# Patient Record
Sex: MALE | Race: BLACK OR AFRICAN AMERICAN | URBAN - METROPOLITAN AREA
[De-identification: names, ages, dates, MRNs, and addresses within clinical notes are randomized per-mention and may not be internally consistent; named-entity substitution may affect disease eponyms.]

---

## 2017-01-20 ENCOUNTER — HISTORICAL (OUTPATIENT)
Dept: ADMINISTRATIVE | Facility: HOSPITAL | Age: 72
End: 2017-01-20

## 2017-08-11 ENCOUNTER — HISTORICAL (OUTPATIENT)
Dept: ADMINISTRATIVE | Facility: HOSPITAL | Age: 72
End: 2017-08-11

## 2018-10-29 ENCOUNTER — HISTORICAL (OUTPATIENT)
Dept: ADMINISTRATIVE | Facility: HOSPITAL | Age: 73
End: 2018-10-29

## 2019-01-06 ENCOUNTER — HISTORICAL (OUTPATIENT)
Dept: ADMINISTRATIVE | Facility: HOSPITAL | Age: 74
End: 2019-01-06

## 2019-02-05 ENCOUNTER — HISTORICAL (OUTPATIENT)
Dept: ADMINISTRATIVE | Facility: HOSPITAL | Age: 74
End: 2019-02-05

## 2019-02-14 ENCOUNTER — HISTORICAL (OUTPATIENT)
Dept: ADMINISTRATIVE | Facility: HOSPITAL | Age: 74
End: 2019-02-14

## 2019-03-16 ENCOUNTER — HISTORICAL (OUTPATIENT)
Dept: ADMINISTRATIVE | Facility: HOSPITAL | Age: 74
End: 2019-03-16

## 2019-04-04 ENCOUNTER — HISTORICAL (OUTPATIENT)
Dept: ADMINISTRATIVE | Facility: HOSPITAL | Age: 74
End: 2019-04-04

## 2022-09-13 NOTE — ED PROVIDER NOTES
Patient:   Murtaza Mantilla             MRN: [id number]            FIN: [id number]               Age:   73 years     Sex:  Male     :  1945   Associated Diagnoses:   Rib pain on left side; Accidental fall   Author:   Ruy Engle MD      Basic Information   Time seen: Date 2019, Immediately upon arrival.   History source: Patient.   Arrival mode: Private vehicle.   History limitation: None.   Additional information: Chief Complaint from Nursing Triage Note : Chief Complaint   2019 12:44 CST       Chief Complaint           reports falling at residence on wed, currently c/o L rib pain, denies SOB, ambulatory, denies hitting head, no LOC  .      History of Present Illness   The patient presents following fall.  The onset was 4  days ago.  The occurrence was single episode.  The fall was described as tripped, slipped, fell from ground level.  The location where the incident occurred was at home.  Location: Left lateral chest. The character of symptoms is pain.  The degree at present is none.  The exacerbating factor is none.  The relieving factor is rest.  Risk factors consist of age.  The patient's dominant hand is the right hand.  Therapy today: none.  Preceding symptoms none.  Associated symptoms: denies chest pain, denies dizziness, denies syncope, denies headache, denies nausea, denies vomiting, denies abdominal pain, denies shortness of breath, denies loss of consciousness, denies fever and denies chills.       72y/o BM presents with complaint of fall, onset 4 days ago. He reports that he accidentally slipped and fell onto the ground hurting his left ribs. Pt was fine however today it was hurting more so he decided to come and get checked. Pt drove himself.  AAox4, GCS 15.        Review of Systems   Constitutional symptoms:  No fever, no chills, no sweats, no weakness, no fatigue, no decreased activity.    Skin symptoms:  No rash, no breakdown.    Eye symptoms:  No recent vision problems, no  diplopia, no blurred vision.    Respiratory symptoms:  No shortness of breath, no cough.    Cardiovascular symptoms:  No chest pain, no palpitations, no tachycardia, no syncope, no peripheral edema.    Gastrointestinal symptoms:  No abdominal pain, no nausea, no vomiting.    Musculoskeletal symptoms:  Muscle pain, no back pain, no Joint pain.    Neurologic symptoms:  No headache, no dizziness, no altered level of consciousness, no numbness, no tingling, no weakness.              Additional review of systems information: All systems reviewed as documented in chart.      Health Status   Allergies:    Allergic Reactions (Selected)  No Known Allergies,    Allergies (1) Active Reaction  No Known Allergies None Documented.   Medications:  (Selected)   Prescriptions  Prescribed  naproxen 500 mg oral tablet: 500 mg = 1 tab(s), Oral, BID, PRN PRN for pain, with food, # 20 tab(s), 0 Refill(s), [id number]  traMADol 50 mg oral tablet: 50 mg = 1 tab(s), Oral, q4hr, PRN PRN for pain, # 24 tab(s), 0 Refill(s), [facility].      Past Medical/ Family/ Social History   Medical history:    Active  HTN - Hypertension ([id number])  GERD - Gastro-esophageal reflux disease ([id number]), Reviewed as documented in chart.   Surgical history:    FACIAL SURGERY., Reviewed as documented in chart.   Family history:    Entire family history is negative., Reviewed as documented in chart.   Social history:    Social & Psychosocial Habits    Alcohol  08/11/2017  Use: Past    02/28/2018  Use: Current    Frequency: 1-2 times per month    Has alcohol use interfered with work or home life? No    Do you ever drink more than intended? No    Has anyone been hurt or at risk by your drinking? No    Ready to change: No    Concerns about alcohol use in household: No    Substance Abuse  12/21/2016  Use: Never    Tobacco  02/16/2015  Use: Current some day smoker    Type: Cigarettes    Previous treatment: None    Ready to  change: No    Concerns about tobacco use in household: No    08/11/2017  Use: Current some day smoker    09/20/2018  Use: Current some day smoker    11/19/2018  Use: Current some day smoker    Patient Wants Consult For Cessation Counseling No    11/26/2018  Use: Current every day smoker    Patient Wants Consult For Cessation Counseling No    12/03/2018  Use: Current every day smoker    Patient Wants Consult For Cessation Counseling No    12/10/2018  Use: Current every day smoker    Patient Wants Consult For Cessation Counseling No    12/28/2018  Use: Current every day smoker    Type: Cigarettes    Patient Wants Consult For Cessation Counseling N/A    01/06/2019  Use: 5-9 cigarettes (between 1    Patient Wants Consult For Cessation Counseling No, Reviewed as documented in chart.   Problem list:    Active Problems (6)  GERD (gastroesophageal reflux disease)   GERD - Gastro-esophageal reflux disease   HTN (hypertension)   HTN - Hypertension   Knowledge deficit   Tobacco user .      Physical Examination               Vital Signs   Vital Signs   1/6/2019 12:44 CST       Temperature Oral          36.6 DegC                             Temperature Oral (calculated)             97.88 DegF                             Peripheral Pulse Rate     74 bpm                             Respiratory Rate          16 br/min                             SpO2                      100 %                             Oxygen Therapy            Room air                             Systolic Blood Pressure   153 mmHg  HI                             Diastolic Blood Pressure  73 mmHg  .      Vital Signs (last 24 hrs)_____  Last Charted___________  Temp Oral     36.6 DegC  (JAN 06 12:44)  Heart Rate Peripheral   74 bpm  (JAN 06 12:44)  Resp Rate         16 br/min  (JAN 06 12:44)  SBP      H 153mmHg  (JAN 06 12:44)  DBP      73 mmHg  (JAN 06 12:44)  SpO2      100 %  (JAN 06 12:44)  Weight      61.35 kg  (JAN 06 12:44)  Height      162 cm  (JAN 06  12:44)  BMI      23.38  (JAN 06 12:44).   General:  Alert, no acute distress.    Neida coma scale:  Eye response: 4 /4, verbal response: 5 /5, motor response: 6 /6, Total score: Total score: 15.    Neurological:  Alert and oriented to person, place, time, and situation, No focal neurological deficit observed, normal sensory observed, normal motor observed, normal speech observed, normal coordination observed.    Skin:  Intact, normal for ethnicity.    Head:  Normocephalic.   Neck:  Supple.   Eye:  Normal conjunctiva.   Ears, nose, mouth and throat:  Oral mucosa moist.   Cardiovascular:  Regular rate and rhythm, Normal peripheral perfusion.    Respiratory:  Lungs are clear to auscultation, respirations are non-labored, breath sounds are equal.    Chest wall:  On exam: Left, lateral, mild, tenderness, reproduces complaint, no ecchymosis, no rash, no shingles, no crepitus, no subcutaneous emphysema, no deformity, no palpable rib fracture(s), no paradoxical motion.   Back:  Nontender, Normal range of motion, Normal alignment, no step-offs.    Gastrointestinal:  Soft, Nontender, Non distended, Normal bowel sounds.    Psychiatric:  Cooperative.      Medical Decision Making   Rationale:  Pt is awake, alert, oriented to self, time, year, place. Remebers how he fell, denies head trauma, LOC. Not on any blood thinners.  Phyiscal exam reveals no trauma to above clavicels. Pt denies headache. Pt drove himself to the ER for evaluation.  .   Documents reviewed:  Emergency department nurses' notes, emergency department records, prior records.    Radiology results:  Rad Results (ST)  < 12 hrs   Accession: [id number]  Order: XR Ribs Left 2 Views  Report Dt/Tm: 01/06/2019 14:32  Report:      CLINICAL: Fall.     FINDINGS: Diffuse demineralization of the bones. No acute fracture  deformity of the left rib cage identified.      IMPRESSION:     No left rib cage fracture identified.       Accession: [id number]  Order: XR Chest 2  Views  Report Dt/Tm: 01/06/2019 14:31  Report:      CLINICAL:  Fall, left rib pain.     COMPARISON: August 11, 2017.     FINDINGS:  Cardiopericardial silhouette is within normal limits.   Lungs are without dense focal or segmental consolidation, congestion,  pleural effusion or pneumothorax.       IMPRESSION:     NO ACUTE CARDIOPULMONARY PROCESS IDENTIFIED.     .      Reexamination/ Reevaluation   Time: 1/6/2019 14:20:00 .   Vital signs   results included from flowsheet : Vital Signs   1/6/2019 14:45 CST       Peripheral Pulse Rate     77 bpm                             Systolic Blood Pressure   131 mmHg                             Diastolic Blood Pressure  72 mmHg                             Mean Arterial Pressure, Cuff              92 mmHg    1/6/2019 12:44 CST       Temperature Oral          36.6 DegC                             Temperature Oral (calculated)             97.88 DegF                             Peripheral Pulse Rate     74 bpm                             Respiratory Rate          16 br/min                             SpO2                      100 %                             Oxygen Therapy            Room air                             Systolic Blood Pressure   153 mmHg  HI                             Diastolic Blood Pressure  73 mmHg     Course: improving.   Pain status: decreased.   Assessment: exam improved, Pt NAD, VSS, pt not ill or toxic appearing, pt with no acute abdomen, no neuro defecits, no active CP or SOB. The patient is resting comfortably and in non acute distress. I personally discussed his test results and treatment plan. recommend tylenol prn  pain. Advised not take anymore tramadol as this can cause dizziness and possible fall.  Pt verbazlies understanding. Specific conditions for return to the emergency department and importance of follow up with his primary care provided or the physician listed on the discharge instructions were also addressed with patient. Patient  voices  understanding and agrees to the plan discussed. All patients questions have been answered at this time. he has remained hemodynamically stable throughout entire stay in ED and is stable for discharge home.     .      Impression and Plan   Diagnosis   Rib pain on left side (OGZ37-JZ R07.81)   Accidental fall (NEV68-PG W19.XXXA)   Plan   Condition: Stable.    Disposition: Medically cleared, Discharged: time  1/6/2019 14:37:00, Time 1/6/2019 14:37:00.    Patient was given the following educational materials: Fall Prevention in the Home.    Follow up with: [facility], Meron Xiong Within 3 to 5 days; Anytime the conditions worsen, return to clinic or go to ED.    Counseled: Patient, Family, Regarding diagnosis, Regarding diagnostic results, Regarding treatment plan, daughters and pt verbalize understanding .

## 2022-09-13 NOTE — ED PROVIDER NOTES
Patient:   Murtaza Mantilla             MRN: [id number]            FIN: [id number]               Age:   72 years     Sex:  Male     :  1945   Associated Diagnoses:   Arm paresthesia, left   Author:   Sonny Courtney MD      Basic Information   Time seen: Date & time 2017 07:00:00.   History source: Patient.   Arrival mode: Private vehicle.   History limitation: None.   Additional information: Chief Complaint from Nursing Triage Note : Chief Complaint   2017 6:49 CDT       Chief Complaint           chest pain onset last night. pt repots starts in lt axilla area and radiated to mid chest area and up lt side of neck. pt denies any other complaints.  .      History of Present Illness   The patient presents with chest pain.  The onset was 1  days ago.  The course/duration of symptoms is resolved: lasted 3 seconds and fluctuating in intensity.  Location: Left axillary. Radiating pain: left and right anterior chest.  left shoulder. The character of symptoms is burning.  The degree at onset was minimal.  The degree at maximum was moderate.  The degree at present is none.  The exacerbating factor is none.  The relieving factor is none.  Prior episodes: non-cardiac.  Therapy today None.  Associated symptoms: denies shortness of breath, denies nausea, denies vomiting, denies diaphoresis, denies anxiety and denies palpitations.      72-year-old gentleman presents emergency room complaints of chest pain.  He describes it as pain in the axillary region of his left arm and radiating to the anterior chest and also to the left upper shoulder and trapezius muscle.  Patient says the pain as a burning sensation.  Lasts only a few seconds then completely resolves.  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  He denies any associated shortness of breath, nausea vomiting, diaphoresis..        Review of Systems   Constitutional  symptoms:  No fever, no chills.    Respiratory symptoms:  No shortness of breath, no orthopnea.    Cardiovascular symptoms:  Negative except as documented in HPI.   Gastrointestinal symptoms:  No abdominal pain, no nausea, no vomiting.    Neurologic symptoms:  No headache, no dizziness.              Additional review of systems information: All other systems reviewed and otherwise negative.      Health Status   Allergies:    Allergic Reactions (Selected)  No Known Allergies,    Allergies (1) Active Reaction  No Known Allergies None Documented  .   Medications:  (Selected)   Prescriptions  Prescribed  Carafate 1 g oral tablet: 1 gm = 1 tab(s), Oral, QID, # 120 tab(s), 0 Refill(s)  Norvasc 5 mg oral tablet: 5 mg = 1 tab(s), Oral, Daily, # 30 tab(s), 0 Refill(s)  Prilosec 40 mg oral DR capsule: 40 mg = 1 cap(s), Oral, Daily, # 30 cap(s), 0 Refill(s)  aspirin 325 mg oral tablet: 325 mg = 1 tab(s), Oral, Daily, # 30 tab(s), 0 Refill(s).      Past Medical/ Family/ Social History   Medical history: Reviewed as documented in chart.   Surgical history:    FACIAL SURGERY., Reviewed as documented in chart.   Family history:    No family history items have been selected or recorded., Reviewed as documented in chart.   Social history:    Social & Psychosocial Habits    Alcohol  02/16/2015  Use: Current    Has alcohol use interfered with work or home life? No    Do you ever drink more than intended? No    Has anyone been hurt or at risk by your drinking? No    Ready to change: No    Concerns about alcohol use in household: No    08/11/2017  Use: Past    Substance Abuse  12/21/2016  Use: Never    Tobacco  02/16/2015  Use: Current some day smoker    Type: Cigarettes    Previous treatment: None    Ready to change: No    Concerns about tobacco use in household: No    08/11/2017  Use: Current some day smoker  , Reviewed as documented in chart.   Problem list:    Active Problems (3)  GERD (gastroesophageal reflux disease)   HTN  (hypertension)   Tobacco user   .      Physical Examination               Vital Signs   Vital Signs   8/11/2017 6:49 CDT       Temperature Oral          36.8 DegC                             Peripheral Pulse Rate     66 bpm                             Respiratory Rate          18 br/min                             SpO2                      100 %                             Oxygen Therapy            Room air                             Systolic Blood Pressure   176 mmHg  HI                             Diastolic Blood Pressure  86 mmHg  .      Vital Signs (last 24 hrs)_____  Last Charted___________  Temp Oral     36.8 DegC  (AUG 11 06:49)  Heart Rate Peripheral   66 bpm  (AUG 11 06:49)  Resp Rate         18 br/min  (AUG 11 06:49)  SBP      H 176mmHg  (AUG 11 06:49)  DBP      86 mmHg  (AUG 11 06:49)  SpO2      100 %  (AUG 11 06:49)  Weight      63.85 kg  (AUG 11 06:49)  Height      162 cm  (AUG 11 06:49)  BMI      24.33  (AUG 11 06:49)  .   Measurements   8/11/2017 6:49 CDT       Weight Dosing             63.85 kg                             Weight Measured           63.85 kg                             Weight Measured and Calculated in Lbs     140.76 lb                             Height/Length Dosing      162 cm                             Height/Length Measured    162 cm                             Body Mass Index Measured  24.33 kg/m2  .   Basic Oxygen Information   8/11/2017 6:49 CDT       SpO2                      100 %                             Oxygen Therapy            Room air  .   General:  Alert, no acute distress.    Skin:  Intact, moist.    Head:  Normocephalic, atraumatic.    Neck:  Supple, trachea midline, no tenderness, no JVD, no carotid bruit.    Eye:  Pupils are equal, round and reactive to light, extraocular movements are intact, normal conjunctiva.    Ears, nose, mouth and throat:  Oral mucosa moist.   Cardiovascular:  Regular rate and rhythm, No murmur, Normal peripheral perfusion, No edema.     Respiratory:  Lungs are clear to auscultation, respirations are non-labored, breath sounds are equal, Symmetrical chest wall expansion.    Chest wall:  No tenderness.   Gastrointestinal:  Soft, Nontender, Non distended, Normal bowel sounds, No organomegaly.    Neurological:  Alert and oriented to person, place, time, and situation, No focal neurological deficit observed.    Psychiatric:  Cooperative, appropriate mood & affect.       Medical Decision Making   Differential Diagnosis:  Atypical chest pain.   Rationale:  Atypical chest pain.  Patient's symptoms are likely related to wearing the guitar strap over his left shoulder.  Will treat with gabapentin for neuralgia since symptoms continue to occur.  Since patient's symptoms are mild, we'll begin with a low dose and have him follow-up with medicine clinic for further titration.  Patient has a history of reflux, but symptoms appear to be very mild.  Due to the patient's age, will prescribe Pepcid instead of Prilosec to avoid hypomagnesemia..   Documents reviewed:  Emergency department nurses' notes.   Electrocardiogram:  Time 8/11/2017 06:49:00, rate 70, normal sinus rhythm, No ST-T changes, no ectopy, normal ID & QRS intervals, EP Interp.    Results review:  Lab results : Lab View   8/11/2017 7:08 CDT       Sodium Lvl                140 mmol/L                             Potassium Lvl             3.9 mmol/L                             Chloride                  104 mmol/L                             CO2                       30 mmol/L                             Calcium Lvl               8.9 mg/dL                             Glucose Lvl               89 mg/dL                             BUN                       13 mg/dL                             Creatinine                0.90 mg/dL                             eGFR-AA                   >105 mL/min                             eGFR-FRIDA                  88 mL/min  LOW                             Bili Total                 0.5 mg/dL                             Bili Direct               0.1 mg/dL                             Bili Indirect             0.4 mg/dL                             AST                       21 unit/L                             ALT                       17 unit/L                             Alk Phos                  61 unit/L                             Total Protein             7.8 gm/dL                             Albumin Lvl               3.7 gm/dL                             Globulin                  4.10 gm/mL  HI                             A/G Ratio                 1 ratio                             WBC                       5.2 x10(3)/mcL                             RBC                       4.87 x10(6)/mcL                             Hgb                       15.6 gm/dL                             Hct                       45.6 %                             Platelet                  240 x10(3)/mcL                             MCV                       93.6 fL                             MCH                       32.0 pg                             MCHC                      34.2 gm/dL                             RDW                       13.1 %                             MPV                       11.1 fL  HI                             Abs Neut                  3.22 x10(3)/mcL                             Neutro Auto               62 x10(3)/mcL  NA                             Lymph Auto                26 %                             Mono Auto                 8 %                             Eos Auto                  3 %                             Abs Eos                   0.15 x10(3)/mcL  NA                             Basophil Auto             1 %                             Abs Neutro                3.22 x10(3)/mcL  NA                             Abs Lymph                 1.34 x10(3)/mcL  NA                             Abs Mono                  0.42 x10(3)/mcL  NA                             Abs  Baso                  0.04 x10(3)/mcL  NA                             IG%                       0 %  NA                             IG#                       0.0100  NA    8/11/2017 7:04 CDT       POC Troponin              0.00 ng/mL  .   Chest X-Ray:  Time reported 8/11/2017 07:35:00, Peribronchial fullness, similar to previous CXR.  No acute findings..       Impression and Plan   Diagnosis   Arm paresthesia, left (HUM05-TV R20.2)   Plan   Condition: Stable.    Disposition: Discharged: Time  8/11/2017 07:57:00, to home.    Prescriptions: [facility]   Pharmacy:  gabapentin 100 mg oral capsule (Prescribe): 100 mg = 1 cap(s), Oral, TID, # 90 cap(s), 0 Refill(s)  Pepcid 20 mg oral tablet (Prescribe): 20 mg = 1 tab(s), Oral, BID, # 60 tab(s), 0 Refill(s)  Prilosec 40 mg oral DR capsule (Discontinue): 8/11/2017 7:46 CDT.    Patient was given the following educational materials: Food Choices for Gastroesophageal Reflux Disease, Adult, Paresthesia.    Follow up with: ; Anytime the conditions worsen, return to clinic or go to ED; [facility]   2 weeks.  .    Counseled: Patient, Regarding diagnosis, Regarding diagnostic results, Regarding treatment plan, Regarding prescription, Patient indicated understanding of instructions.

## 2022-09-13 NOTE — HISTORICAL [facility] CERNER
This is a historical note converted from Elvin. Formatting and pictures may have been removed.  Please reference Elvin for original formatting and attached multimedia. Chief Complaint  c/o left shoulder pain x3 months and first two digits on left hand stiff. no recent injury  History of Present Illness  73yo?male?PMH of?neuropathy presents to [facility] complaining of?chronic L shoulder pain?for?years.? Pt states he?hurt his shoulder 5yrs prior while putting on a seatbelt and then more recently he fell?a few months?back but?doesnt think that was main culprit.? Pt has?visited the ER?and Urgent care multiple times for this complaint.? He has tried a muscle relaxant and Gabapentin without much relief.? He is currently doing formal PT.  ?   He has a hobby of playing [employer] and has played for 50+ yrs.  ?   Pt reports the symptoms as: 5/10, achy,?moderate, alleviated with rest, worsened with activity.  ?   PT has tried?NSAIDs?with?minimal?relief.  ?  Review of Systems  Constitutional:no fever,no chills, no weight loss,no fatigue  HEENT:no sore throat,no nasal congestion,no ear ache,no cough  CV: no swelling, no edema,no palpitations  Resp:no SOB, wheezing  GI: no fecal incontinence,?no dysuria,?no hematuria,?no?abd pain  :? no urinary retention, no urinary incontinence  Skin:no rash, no wound  Neuro: no numbness/tingling, no weakness, no saddle anesthesia  MSK:LROM of upper and lower extremities, + pain  Psych: no depression, no anxiety  Heme/Lymph: no easy bruising, no easy bleeding, no lymphadenopathy  Immuno: no MRSA history  Physical Exam  Vitals & Measurements  HR:?86(Peripheral)? RR:?18? BP:?137/82? SpO2:?100%?  HT:?162?cm? WT:?62.1?kg? BMI:?23.66?  Right Shoulder  Inspection:?no swelling noted,?no erythema,?no bruising,?no atrophy  Palpation:?non tender  ?? Active Passive   Forward Flexion (0-180) 180 180   Extension (0-60) 60 60   Abduction (0-180) 180 180   Adduction (0-140) 140 140   Internal  Rotation?(0-90) T10 T10   External Rotation (0-60) 50 50   ?  Strength: FF 5/5, Abduction 5/5, Adduction 5/5, Internal Rotation 5/5, External Rotation 5/5  ?   Special Testing:  Empty can Test:?negative  Lift-off Test:?negative  Drop Arm Test:?negative  Neers Test:?negative  Corona Test:?negative  Speeds Test:?negative  Yergasons Test: negative  OBriens Test:?negative  Cross Body Adduction Test: ?negative  ?  Left Shoulder  Inspection:??no swelling noted,??no erythema,??no bruising,??no atrophy  Palpation: TTP at L Levator sacpula and trap  ?? Active Passive   Forward Flexion (0-180) 180 180   Extension (0-60) 60 60   Abduction (0-180) 180 180   Adduction (0-140) 140 140   Internal Rotation?(0-90) T10 T10   External Rotation (0-60) 50 50   ?  Strength: FF 5/5, Abduction 5/5, Adduction 5/5, Internal Rotation 5/5, External Rotation 5/5  ?   Special Testing:  Empty can Test:??negative  Lift-off Test:??negative  Drop Arm Test:??negative  Neers Test:??negative  Corona Test:??negative  Speeds Test:??negative  Yergasons Test: negative  OBriens Test:??negative  Cross Body Adduction Test: ?negative  ?  Post Injection Left Shoulder Levator Scapular Trigger point CSI:  Increased ROM in all planes  Improvement in strength 5/5  tolerated well, no acute changes to respirations  ?   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  - as below  Impingement syndrome, shoulder, left?M75.42  - Asymptomatic on special tests  Strain of levator scapulae muscle?S46.431O  - Radiological studies ordered and reviewed by me, interpretation attached  -?L Levator scap  and trapezius?CSI trigger point inj?performed today, consented, tolerated well, NVI following injection and improvement in symptoms  - Activity as tolerated  - Cont PT, HEP, NSAIDs/Tyl prn, Ice/Heat prn  - Follow up with PCP   Problem List/Past Medical History  Ongoing  GERD (gastroesophageal reflux disease)  GERD - Gastro-esophageal reflux disease  HTN (hypertension)  HTN - Hypertension  Knowledge deficit  Tobacco user  Historical  No qualifying data  Procedure/Surgical History  FACIAL SURGERY   Medications  gabapentin 300 mg oral capsule, 300 mg= 1 cap(s), Oral, TID  meloxicam 15 mg oral tablet, 15 mg= 1 tab(s), Oral, Daily,? ?Not Taking, Completed Rx  prednisONE 50 mg oral tablet, 50 mg= 1 tab(s), Oral, Daily,? ?Not Taking, Completed Rx  Robaxin 750 mg oral tablet, 1500 mg= 2 tab(s), Oral, TID,? ?Not Taking, Completed Rx  Tylenol with Codeine #3 oral tablet, 1 tab(s), Oral, q12hr, PRN  Allergies  No Known Allergies  Social History  Alcohol  Current, Beer, 1-2 times per year, 03/16/2019  Employment/School  Employed, 03/16/2019  Home/Environment  Lives with Children, Spouse., 03/16/2019  Nutrition/Health  Regular, 03/16/2019  Substance Abuse  Never, 12/21/2016  Tobacco  5-9 cigarettes (between 1/4 to 1/2 pack)/day in last 30 days, Cigarettes, N/A, 03/16/2019  Family History  Family history is negative  Health Maintenance  Health Maintenance  ???Pending?(in the next year)  ??? ??OverDue  ??? ? ? ?Hypertension Maintenance-Medication Prescribed due??12/21/17??and every 1??year(s)  ??? ? ? ?Hypertension Management-BMP due??08/11/18??and every 1??year(s)  ??? ??Due?  ??? ? ? ?ADL Screening due??03/16/19??and every 1??year(s)  ??? ? ? ?Advance Directive due??03/16/19??and every 1??year(s)  ??? ? ? ?Alcohol Misuse Screening due??03/16/19??and every 1??year(s)  ??? ? ? ?Aspirin Therapy for CVD Prevention due??03/16/19??and every 1??year(s)  ??? ? ? ?Cognitive Screening due??03/16/19??and every 1??year(s)  ??? ? ?  ?Colorectal Screening (Senior Wellness) due??03/16/19??and every?  ??? ? ? ?Functional Assessment due??03/16/19??and every 1??year(s)  ??? ? ? ?Geriatric Depression Screening due??03/16/19??and every 1??year(s)  ??? ? ? ?Hypertension Management-Education due??03/16/19??and every 1??year(s)  ??? ? ? ?Pneumococcal Vaccine due??03/16/19??Variable frequency  ??? ? ? ?Pneumococcal Vaccine due??03/16/19??and every?  ??? ? ? ?Tetanus Vaccine due??03/16/19??and every 10??year(s)  ??? ? ? ?Zoster Vaccine due??03/16/19??and every 100??year(s)  ??? ??Due In Future?  ??? ? ? ?Smoking Cessation not due until??09/20/19??and every 1??year(s)  ??? ? ? ?Lung Cancer Screening not due until??10/29/19??and every 1??year(s)  ??? ? ? ?Hypertension Management-Blood Pressure not due until??03/15/20??and every 1??year(s)  ???Satisfied?(in the past 1 year)  ??? ??Satisfied?  ??? ? ? ?Abdominal Aortic Aneurysm Screening on??10/29/18.??Satisfied by Marlon Nguyễn  ??? ? ? ?Blood Pressure Screening on??03/16/19.??Satisfied by Veillon LPN, Alka K  ??? ? ? ?Body Mass Index Check on??03/16/19.??Satisfied by Veillon LPN, Alka K  ??? ? ? ?Depression Screening on??03/16/19.??Satisfied by Veillon LPN, Alka K  ??? ? ? ?Fall Risk Assessment on??03/16/19.??Satisfied by Veillon LPN, Alka K  ??? ? ? ?Hypertension Management-Blood Pressure on??03/16/19.??Satisfied by Veillon LPN, Alka K  ??? ? ? ?Influenza Vaccine on??03/16/19.??Satisfied by Alka Valdes LPN  ??? ? ? ?Lung Cancer Screening on??10/29/18.??Satisfied by Ar Mendoza RTPete  ??? ? ? ?Obesity Screening on??03/16/19.??Satisfied by Alka Valdes LPN  ??? ? ? ?Smoking Cessation on??09/20/18.??Satisfied by Yeimy Umanzor  ?  ?  Diagnostic Results  XR L Shoulder 3/16/19:  No acute fracture or dislocation present. subacromial space narrowing.? AC jt arthritis present.? sup humeral head migration.